# Patient Record
Sex: FEMALE | Race: WHITE | NOT HISPANIC OR LATINO | Employment: FULL TIME | URBAN - METROPOLITAN AREA
[De-identification: names, ages, dates, MRNs, and addresses within clinical notes are randomized per-mention and may not be internally consistent; named-entity substitution may affect disease eponyms.]

---

## 2019-04-23 ENCOUNTER — HISTORICAL (OUTPATIENT)
Dept: ADMINISTRATIVE | Facility: HOSPITAL | Age: 24
End: 2019-04-23

## 2019-04-23 LAB
ABS NEUT (OLG): 8.85 X10(3)/MCL (ref 2.1–9.2)
BASOPHILS # BLD AUTO: 0 X10(3)/MCL (ref 0–0.2)
BASOPHILS NFR BLD AUTO: 0 %
EOSINOPHIL # BLD AUTO: 0.4 X10(3)/MCL (ref 0–0.9)
EOSINOPHIL NFR BLD AUTO: 4 %
ERYTHROCYTE [DISTWIDTH] IN BLOOD BY AUTOMATED COUNT: 12.9 % (ref 11.5–17)
GROUP & RH: NORMAL
HBV SURFACE AG SERPL QL IA: NEGATIVE
HCT VFR BLD AUTO: 38.6 % (ref 37–47)
HGB BLD-MCNC: 12.4 GM/DL (ref 12–16)
HIV 1+2 AB+HIV1 P24 AG SERPL QL IA: NEGATIVE
LYMPHOCYTES # BLD AUTO: 1.3 X10(3)/MCL (ref 0.6–4.6)
LYMPHOCYTES NFR BLD AUTO: 11 %
MCH RBC QN AUTO: 30.2 PG (ref 27–31)
MCHC RBC AUTO-ENTMCNC: 32.1 GM/DL (ref 33–36)
MCV RBC AUTO: 94.1 FL (ref 80–94)
MONOCYTES # BLD AUTO: 0.6 X10(3)/MCL (ref 0.1–1.3)
MONOCYTES NFR BLD AUTO: 6 %
NEUTROPHILS # BLD AUTO: 8.85 X10(3)/MCL (ref 2.1–9.2)
NEUTROPHILS NFR BLD AUTO: 78 %
PLATELET # BLD AUTO: 263 X10(3)/MCL (ref 130–400)
PMV BLD AUTO: 9.9 FL (ref 9.4–12.4)
RBC # BLD AUTO: 4.1 X10(6)/MCL (ref 4.2–5.4)
T PALLIDUM AB SER QL: NORMAL
TSH SERPL-ACNC: 1.76 MIU/L (ref 0.36–3.74)
WBC # SPEC AUTO: 11.3 X10(3)/MCL (ref 4.5–11.5)

## 2019-04-25 LAB — FINAL CULTURE: NO GROWTH

## 2019-05-02 ENCOUNTER — HISTORICAL (OUTPATIENT)
Dept: ADMINISTRATIVE | Facility: HOSPITAL | Age: 24
End: 2019-05-02

## 2019-05-02 LAB — GLUCOSE 1H P 100 G GLC PO SERPL-MCNC: 78 MG/DL (ref 100–180)

## 2019-08-29 ENCOUNTER — HISTORICAL (OUTPATIENT)
Dept: LAB | Facility: HOSPITAL | Age: 24
End: 2019-08-29

## 2019-08-31 LAB — FINAL CULTURE: NORMAL

## 2022-08-31 ENCOUNTER — HOSPITAL ENCOUNTER (EMERGENCY)
Facility: HOSPITAL | Age: 27
Discharge: HOME | End: 2022-08-31
Attending: EMERGENCY MEDICINE
Payer: COMMERCIAL

## 2022-08-31 VITALS
SYSTOLIC BLOOD PRESSURE: 119 MMHG | HEART RATE: 95 BPM | RESPIRATION RATE: 18 BRPM | BODY MASS INDEX: 36.8 KG/M2 | TEMPERATURE: 98 F | WEIGHT: 200 LBS | OXYGEN SATURATION: 97 % | DIASTOLIC BLOOD PRESSURE: 79 MMHG | HEIGHT: 62 IN

## 2022-08-31 DIAGNOSIS — K04.7 DENTAL ABSCESS: Primary | ICD-10-CM

## 2022-08-31 PROCEDURE — 99284 EMERGENCY DEPT VISIT MOD MDM: CPT | Mod: 25

## 2022-08-31 RX ORDER — HYDROCODONE BITARTRATE AND ACETAMINOPHEN 7.5; 325 MG/1; MG/1
1 TABLET ORAL EVERY 6 HOURS PRN
Qty: 20 TABLET | Refills: 0 | OUTPATIENT
Start: 2022-08-31 | End: 2023-04-10

## 2022-08-31 RX ORDER — AMOXICILLIN 500 MG/1
500 CAPSULE ORAL 3 TIMES DAILY
Qty: 21 CAPSULE | Refills: 0 | Status: SHIPPED | OUTPATIENT
Start: 2022-08-31 | End: 2022-09-07

## 2022-08-31 NOTE — ED PROVIDER NOTES
Encounter Date: 8/31/2022       History     Chief Complaint   Patient presents with    Dental Pain     Pt c/o having a toothache onset one month ago.     27-year-old female comes to the emergency room stating she has been having pain in her left upper 3rd molar for a few weeks.  Pain became worse so she came to the ER.  She has seen a dentist who scheduled to pull her tooth, but the dentist once her cardiologist to clear her because she has mitral valve prolapse, so the procedure has been delayed.  She is having no fevers chills nor sweats.  No headache neck pain nausea vomiting.    Review of patient's allergies indicates:   Allergen Reactions    Asparaginase      Other reaction(s): rash, unsure    Cephalosporins      Other reaction(s): seizures, unknown     No past medical history on file.  No past surgical history on file.  No family history on file.     Review of Systems   Constitutional:  Negative for fever.   HENT:  Negative for sore throat.    Respiratory:  Negative for shortness of breath.    Cardiovascular:  Negative for chest pain.   Gastrointestinal:  Negative for nausea.   Genitourinary:  Negative for dysuria.   Musculoskeletal:  Negative for back pain.   Skin:  Negative for rash.   Neurological:  Negative for weakness.   Hematological:  Does not bruise/bleed easily.     Physical Exam     Initial Vitals [08/31/22 1808]   BP Pulse Resp Temp SpO2   119/79 95 18 97.7 °F (36.5 °C) 97 %      MAP       --         Physical Exam    Constitutional: She appears well-developed. No distress.   HENT:   Mouth/Throat: Oropharynx is clear and moist.   Left upper 3rd molar is intact.  There is no swelling.  No sign of infection.  No drainable abscess.   Eyes: Conjunctivae are normal.   Cardiovascular:  Normal rate, regular rhythm and normal heart sounds.     Exam reveals no gallop and no friction rub.       No murmur heard.  Pulmonary/Chest: Breath sounds normal. No respiratory distress. She has no wheezes. She has no  rhonchi. She has no rales.   Abdominal: Abdomen is soft. Bowel sounds are normal. She exhibits no distension. There is no abdominal tenderness. There is no guarding.   Musculoskeletal:         General: No edema.     Lymphadenopathy:     She has no cervical adenopathy.   Neurological: She is alert. She displays a negative Romberg sign. Coordination and gait normal. GCS eye subscore is 4. GCS verbal subscore is 5. GCS motor subscore is 6.   Skin: Skin is warm.   Psychiatric: She has a normal mood and affect.       ED Course   Procedures  Labs Reviewed - No data to display       Imaging Results    None          Medications - No data to display                       Clinical Impression:   Final diagnoses:  [K04.7] Dental abscess (Primary)      ED Disposition Condition    Discharge Stable          ED Prescriptions       Medication Sig Dispense Start Date End Date Auth. Provider    amoxicillin (AMOXIL) 500 MG capsule Take 1 capsule (500 mg total) by mouth 3 (three) times daily. for 7 days 21 capsule 8/31/2022 9/7/2022 Bg Arias Jr., MD    HYDROcodone-acetaminophen (NORCO) 7.5-325 mg per tablet Take 1 tablet by mouth every 6 (six) hours as needed for Pain. 20 tablet 8/31/2022 -- Bg Arias Jr., MD          Follow-up Information       Follow up With Specialties Details Why Contact Info      In 1 week               Bg Arias Jr., MD  08/31/22 3292

## 2023-04-10 ENCOUNTER — HOSPITAL ENCOUNTER (EMERGENCY)
Facility: HOSPITAL | Age: 28
Discharge: HOME | End: 2023-04-10
Attending: EMERGENCY MEDICINE
Payer: COMMERCIAL

## 2023-04-10 VITALS
TEMPERATURE: 98 F | SYSTOLIC BLOOD PRESSURE: 117 MMHG | HEART RATE: 77 BPM | OXYGEN SATURATION: 98 % | DIASTOLIC BLOOD PRESSURE: 72 MMHG | RESPIRATION RATE: 18 BRPM

## 2023-04-10 DIAGNOSIS — G44.319 ACUTE POST-TRAUMATIC HEADACHE, NOT INTRACTABLE: ICD-10-CM
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
## 2023-04-10 PROCEDURE — 99284 EMERGENCY DEPT VISIT MOD MDM: CPT

## 2023-04-10 PROCEDURE — 25000003 PHARM REV CODE 250: Performed by: EMERGENCY MEDICINE

## 2023-04-10 RX ORDER — HYDROCODONE BITARTRATE AND ACETAMINOPHEN 10; 325 MG/1; MG/1
1 TABLET ORAL EVERY 6 HOURS PRN
Qty: 20 TABLET | Refills: 0 | Status: SHIPPED | OUTPATIENT
Start: 2023-04-10 | End: 2023-04-15

## 2023-04-10 RX ORDER — IBUPROFEN 800 MG/1
800 TABLET ORAL 3 TIMES DAILY
Qty: 30 TABLET | Refills: 0 | Status: SHIPPED | OUTPATIENT
Start: 2023-04-10 | End: 2023-04-20

## 2023-04-10 RX ORDER — CYCLOBENZAPRINE HCL 10 MG
10 TABLET ORAL 3 TIMES DAILY PRN
Qty: 15 TABLET | Refills: 0 | Status: SHIPPED | OUTPATIENT
Start: 2023-04-10 | End: 2023-04-15

## 2023-04-10 RX ORDER — HYDROCODONE BITARTRATE AND ACETAMINOPHEN 10; 325 MG/1; MG/1
1 TABLET ORAL
Status: COMPLETED | OUTPATIENT
Start: 2023-04-10 | End: 2023-04-10

## 2023-04-10 RX ORDER — CYCLOBENZAPRINE HCL 10 MG
10 TABLET ORAL
Status: COMPLETED | OUTPATIENT
Start: 2023-04-10 | End: 2023-04-10

## 2023-04-10 RX ADMIN — HYDROCODONE BITARTRATE AND ACETAMINOPHEN 1 TABLET: 10; 325 TABLET ORAL at 09:04

## 2023-04-10 RX ADMIN — CYCLOBENZAPRINE HYDROCHLORIDE 10 MG: 10 TABLET, FILM COATED ORAL at 09:04
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
## 2023-07-25 ENCOUNTER — HOSPITAL ENCOUNTER (EMERGENCY)
Facility: HOSPITAL | Age: 28
Discharge: HOME | End: 2023-07-25
Attending: EMERGENCY MEDICINE
Payer: MEDICAID

## 2023-07-25 VITALS
SYSTOLIC BLOOD PRESSURE: 140 MMHG | HEART RATE: 81 BPM | OXYGEN SATURATION: 98 % | DIASTOLIC BLOOD PRESSURE: 85 MMHG | RESPIRATION RATE: 17 BRPM | TEMPERATURE: 98 F

## 2023-07-25 DIAGNOSIS — Z30.09 EMERGENCY CONTRACEPTIVE COUNSELING: Primary | ICD-10-CM

## 2023-07-25 LAB
APPEARANCE UR: CLEAR
B-HCG SERPL QL: NEGATIVE
BACTERIA #/AREA URNS AUTO: NORMAL /HPF
BILIRUB UR QL STRIP.AUTO: NEGATIVE
COLOR UR: YELLOW
GLUCOSE UR QL STRIP.AUTO: NEGATIVE
KETONES UR QL STRIP.AUTO: ABNORMAL
LEUKOCYTE ESTERASE UR QL STRIP.AUTO: ABNORMAL
NITRITE UR QL STRIP.AUTO: NEGATIVE
PH UR STRIP.AUTO: 7 [PH]
PROT UR QL STRIP.AUTO: NEGATIVE
RBC #/AREA URNS AUTO: <5 /HPF
RBC UR QL AUTO: NEGATIVE
SP GR UR STRIP.AUTO: 1.01 (ref 1–1.03)
SQUAMOUS #/AREA URNS AUTO: <5 /HPF
UROBILINOGEN UR STRIP-ACNC: 0.2
WBC #/AREA URNS AUTO: <5 /HPF

## 2023-07-25 PROCEDURE — 81001 URINALYSIS AUTO W/SCOPE: CPT | Performed by: PHYSICIAN ASSISTANT

## 2023-07-25 PROCEDURE — 99283 EMERGENCY DEPT VISIT LOW MDM: CPT

## 2023-07-25 PROCEDURE — 81025 URINE PREGNANCY TEST: CPT | Performed by: PHYSICIAN ASSISTANT

## 2023-07-25 NOTE — ED PROVIDER NOTES
Encounter Date: 7/25/2023       History     Chief Complaint   Patient presents with    medical     Had unprotected intercourse last Saturday, went to a clinic and was tld to come to the er because they could not administer the med( Plan B) to her. Denies any other complaints     28-year-old female presents to ED for evaluation emergency contraceptive.  Patient reports that she had unprotected intercourse on Saturday morning.  Patient is concerned for possible pregnancy like to have plan be.  States she went to pharmacy to get over-the-counter however was told she was outside of the window.  Patient denies any symptoms of abdominal pain, vaginal bleeding or vaginal discharge.    The history is provided by the patient. No  was used.   Review of patient's allergies indicates:   Allergen Reactions    Asparaginase      Other reaction(s): rash, unsure    Cephalosporins      Other reaction(s): seizures, unknown     No past medical history on file.  Past Surgical History:   Procedure Laterality Date    SINUS SURGERY       No family history on file.  Social History     Tobacco Use    Smoking status: Never    Smokeless tobacco: Never   Substance Use Topics    Alcohol use: Not Currently     Review of Systems   Constitutional:  Negative for chills, fatigue and fever.   Respiratory:  Negative for shortness of breath.    Cardiovascular:  Negative for chest pain.   Gastrointestinal:  Negative for abdominal pain, nausea and vomiting.   Genitourinary:  Negative for dysuria, menstrual problem, pelvic pain, urgency, vaginal bleeding, vaginal discharge and vaginal pain.   Musculoskeletal:  Negative for back pain.   Skin:  Negative for rash.   Neurological:  Negative for weakness.   Hematological:  Does not bruise/bleed easily.     Physical Exam     Initial Vitals [07/25/23 1206]   BP Pulse Resp Temp SpO2   (!) 140/85 81 17 98.4 °F (36.9 °C) 98 %      MAP       --         Physical Exam    Nursing note and vitals  reviewed.  Constitutional: She appears well-developed and well-nourished.   HENT:   Head: Normocephalic and atraumatic.   Right Ear: Tympanic membrane and external ear normal.   Left Ear: Tympanic membrane and external ear normal.   Mouth/Throat: Uvula is midline, oropharynx is clear and moist and mucous membranes are normal. No trismus in the jaw. No uvula swelling. No oropharyngeal exudate, posterior oropharyngeal edema or posterior oropharyngeal erythema.   Eyes: Conjunctivae are normal. Pupils are equal, round, and reactive to light.   Neck: Neck supple.   Normal range of motion.  Cardiovascular:  Normal rate, regular rhythm and normal heart sounds.           Pulmonary/Chest: Breath sounds normal. She has no wheezes. She has no rhonchi. She has no rales.   Abdominal: Abdomen is soft. Bowel sounds are normal. There is no abdominal tenderness.   Musculoskeletal:         General: Normal range of motion.      Cervical back: Normal range of motion and neck supple.     Neurological: She is alert and oriented to person, place, and time. She has normal strength. No cranial nerve deficit or sensory deficit. GCS score is 15. GCS eye subscore is 4. GCS verbal subscore is 5. GCS motor subscore is 6.   Skin: Skin is warm and dry.   Psychiatric: She has a normal mood and affect.       ED Course   Procedures  Labs Reviewed   URINALYSIS, REFLEX TO URINE CULTURE - Abnormal; Notable for the following components:       Result Value    Ketones, UA Trace (*)     Leukocyte Esterase, UA 1+ (*)     All other components within normal limits   PREGNANCY TEST, URINE RAPID - Normal   URINALYSIS, MICROSCOPIC - Normal          Imaging Results    None          Medications - No data to display  Medical Decision Making:   Initial Assessment:   28-year-old female presents to ED for evaluation emergency contraceptive.  Patient reports that she had unprotected intercourse on Saturday morning around 0500.  Patient is concerned for possible  pregnancy like to have plan be.  States she went to pharmacy to get over-the-counter however was told she was outside of the window.  Patient denies any symptoms of abdominal pain, vaginal bleeding or vaginal discharge.  Differential Diagnosis:   Emergency contraceptive, pregnancy  ED Management:  Patient afebrile and in no acute distress.  Pregnancy test negative. Patient last had unprotected intercourse greater than 72 hours ago, therefore unable to give RX for plan B. discussed contraceptive methods with patient reviewed follow up with GYN. Discussed follow up with health for possible STD testing as indicated. Return ED precautions given.  I provided counseling to patient with regard to condition, the treatment plan, specific conditions for return, and the importance of follow up. Detailed written and verbal instructions provided to patient and she expressed a verbal understanding of the discharge instructions and overall management plan. Reiterated the importance of medication administration and safety. Advised patient to follow up with primary care provider in 3-5 days or sooner if needed.  Answered questions at this time. The patient is stable for discharge.                           Clinical Impression:   Final diagnoses:  [Z30.09] Emergency contraceptive counseling (Primary)        ED Disposition Condition    Discharge Stable          ED Prescriptions    None       Follow-up Information       Follow up With Specialties Details Why Contact Info    PCP  In 1 week As needed, If you do not have a PCP you may call [phone] to help get set up If you do not have a PCP you may call [phone] to help get set up.    Yuval Robles MD Obstetrics and Gynecology Call in 3 days As needed [address] [facility] [address] [phone]      Paul Robles Jr., MD Obstetrics and Gynecology   [facility] [address] [phone]               FIDELIA Johnson  07/25/23 2583

## 2023-07-25 NOTE — FIRST PROVIDER EVALUATION
Medical screening examination initiated.  I have conducted a focused provider triage encounter, findings are as follows:    Brief history of present illness:  27 yo female presents to ED for emergency contraceptive. Patient reports to having unprotected intercourse 3 days ago. States past deadline for OTC plan B.     Vitals:    07/25/23 1206   BP: (!) 140/85   BP Location: Left arm   Patient Position: Sitting   Pulse: 81   Resp: 17   Temp: 98.4 °F (36.9 °C)   TempSrc: Oral   SpO2: 98%       Pertinent physical exam:  Patient is awake and alert and oriented.  Ambulatory to triage.  In no acute distress.      Brief workup plan:  UA, UPT    Preliminary workup initiated; this workup will be continued and followed by the physician or advanced practice provider that is assigned to the patient when roomed.  
regular/mild belly breathing and supraclavicular retractions

## 2024-11-19 ENCOUNTER — TELEPHONE (OUTPATIENT)
Dept: PEDIATRIC HEMATOLOGY/ONCOLOGY | Facility: CLINIC | Age: 29
End: 2024-11-19
Payer: MEDICAID

## 2024-11-19 NOTE — TELEPHONE ENCOUNTER
Puja, patient's mom called (had permission to speak to her) asking about recommendations for oncologist.  She was concerned about recent CT results.  Discussed with Dr. Sibley.  He recommend going to [facility].  Spoke to Kamala's NP, Kathe about all of this as well.  She will order an MRI and place referral to [facility].  Plan to keep AYA appt on 12/18.  Mom was very pleased with this plan and expressed her gratitude. Encouraged her to call with any issues. She verbalized understanding and agreement.

## 2024-12-16 ENCOUNTER — TELEPHONE (OUTPATIENT)
Dept: PEDIATRIC HEMATOLOGY/ONCOLOGY | Facility: CLINIC | Age: 29
End: 2024-12-16
Payer: MEDICAID

## 2024-12-16 NOTE — TELEPHONE ENCOUNTER
Called to remind patient about her upcoming AYA appt on Wed.  No answer, left message with contact information.

## 2024-12-18 ENCOUNTER — PATIENT MESSAGE (OUTPATIENT)
Dept: PEDIATRIC HEMATOLOGY/ONCOLOGY | Facility: CLINIC | Age: 29
End: 2024-12-18

## 2024-12-18 ENCOUNTER — LAB VISIT (OUTPATIENT)
Dept: LAB | Facility: HOSPITAL | Age: 29
End: 2024-12-18
Payer: MEDICAID

## 2024-12-18 ENCOUNTER — SOCIAL WORK (OUTPATIENT)
Dept: PEDIATRIC HEMATOLOGY/ONCOLOGY | Facility: CLINIC | Age: 29
End: 2024-12-18
Payer: MEDICAID

## 2024-12-18 ENCOUNTER — OFFICE VISIT (OUTPATIENT)
Dept: PEDIATRIC HEMATOLOGY/ONCOLOGY | Facility: CLINIC | Age: 29
End: 2024-12-18
Payer: MEDICAID

## 2024-12-18 VITALS
RESPIRATION RATE: 20 BRPM | HEIGHT: 64 IN | OXYGEN SATURATION: 99 % | HEART RATE: 71 BPM | DIASTOLIC BLOOD PRESSURE: 63 MMHG | BODY MASS INDEX: 27.8 KG/M2 | TEMPERATURE: 98 F | WEIGHT: 162.81 LBS | SYSTOLIC BLOOD PRESSURE: 110 MMHG

## 2024-12-18 DIAGNOSIS — Z85.6 HX OF ACUTE LYMPHOID LEUKEMIA IN REMISSION: ICD-10-CM

## 2024-12-18 DIAGNOSIS — F41.9 ANXIETY: ICD-10-CM

## 2024-12-18 DIAGNOSIS — Z85.6 HX OF ACUTE LYMPHOID LEUKEMIA IN REMISSION: Primary | ICD-10-CM

## 2024-12-18 DIAGNOSIS — Z92.21 HISTORY OF CANCER CHEMOTHERAPY: ICD-10-CM

## 2024-12-18 LAB
ALBUMIN SERPL BCP-MCNC: 4.1 G/DL (ref 3.5–5.2)
ALBUMIN/CREAT UR: 6 UG/MG (ref 0–30)
ALP SERPL-CCNC: 60 U/L (ref 40–150)
ALT SERPL W/O P-5'-P-CCNC: 14 U/L (ref 10–44)
ANION GAP SERPL CALC-SCNC: 9 MMOL/L (ref 8–16)
AST SERPL-CCNC: 16 U/L (ref 10–40)
B-HCG UR QL: NEGATIVE
BASOPHILS # BLD AUTO: 0.05 K/UL (ref 0–0.2)
BASOPHILS NFR BLD: 0.7 % (ref 0–1.9)
BILIRUB SERPL-MCNC: 0.3 MG/DL (ref 0.1–1)
BILIRUB UR QL STRIP: NEGATIVE
BUN SERPL-MCNC: 8 MG/DL (ref 6–20)
CALCIUM SERPL-MCNC: 9.6 MG/DL (ref 8.7–10.5)
CHLORIDE SERPL-SCNC: 105 MMOL/L (ref 95–110)
CLARITY UR REFRACT.AUTO: CLEAR
CO2 SERPL-SCNC: 25 MMOL/L (ref 23–29)
COLOR UR AUTO: YELLOW
CREAT SERPL-MCNC: 0.8 MG/DL (ref 0.5–1.4)
CREAT UR-MCNC: 100 MG/DL (ref 15–325)
DIFFERENTIAL METHOD BLD: NORMAL
EOSINOPHIL # BLD AUTO: 0.2 K/UL (ref 0–0.5)
EOSINOPHIL NFR BLD: 2.3 % (ref 0–8)
ERYTHROCYTE [DISTWIDTH] IN BLOOD BY AUTOMATED COUNT: 12.1 % (ref 11.5–14.5)
EST. GFR  (NO RACE VARIABLE): >60 ML/MIN/1.73 M^2
ESTIMATED AVG GLUCOSE: 97 MG/DL (ref 68–131)
GLUCOSE SERPL-MCNC: 83 MG/DL (ref 70–110)
GLUCOSE UR QL STRIP: NEGATIVE
HBA1C MFR BLD: 5 % (ref 4–5.6)
HCT VFR BLD AUTO: 44 % (ref 37–48.5)
HGB BLD-MCNC: 14.1 G/DL (ref 12–16)
HGB UR QL STRIP: NEGATIVE
IMM GRANULOCYTES # BLD AUTO: 0.03 K/UL (ref 0–0.04)
IMM GRANULOCYTES NFR BLD AUTO: 0.4 % (ref 0–0.5)
KETONES UR QL STRIP: NEGATIVE
LEUKOCYTE ESTERASE UR QL STRIP: NEGATIVE
LYMPHOCYTES # BLD AUTO: 2.1 K/UL (ref 1–4.8)
LYMPHOCYTES NFR BLD: 27.8 % (ref 18–48)
MAGNESIUM SERPL-MCNC: 2.1 MG/DL (ref 1.6–2.6)
MCH RBC QN AUTO: 29.2 PG (ref 27–31)
MCHC RBC AUTO-ENTMCNC: 32 G/DL (ref 32–36)
MCV RBC AUTO: 91 FL (ref 82–98)
MICROALBUMIN UR DL<=1MG/L-MCNC: 6 UG/ML
MONOCYTES # BLD AUTO: 0.5 K/UL (ref 0.3–1)
MONOCYTES NFR BLD: 6.5 % (ref 4–15)
NEUTROPHILS # BLD AUTO: 4.8 K/UL (ref 1.8–7.7)
NEUTROPHILS NFR BLD: 62.3 % (ref 38–73)
NITRITE UR QL STRIP: NEGATIVE
NRBC BLD-RTO: 0 /100 WBC
PH UR STRIP: 8 [PH] (ref 5–8)
PHOSPHATE SERPL-MCNC: 3.1 MG/DL (ref 2.7–4.5)
PLATELET # BLD AUTO: 293 K/UL (ref 150–450)
PMV BLD AUTO: 9.7 FL (ref 9.2–12.9)
POTASSIUM SERPL-SCNC: 4.4 MMOL/L (ref 3.5–5.1)
PROT SERPL-MCNC: 7.8 G/DL (ref 6–8.4)
PROT UR QL STRIP: NEGATIVE
RBC # BLD AUTO: 4.83 M/UL (ref 4–5.4)
SODIUM SERPL-SCNC: 139 MMOL/L (ref 136–145)
SP GR UR STRIP: 1.02 (ref 1–1.03)
URN SPEC COLLECT METH UR: NORMAL
WBC # BLD AUTO: 7.66 K/UL (ref 3.9–12.7)

## 2024-12-18 PROCEDURE — 85025 COMPLETE CBC W/AUTO DIFF WBC: CPT | Performed by: NURSE PRACTITIONER

## 2024-12-18 PROCEDURE — 3078F DIAST BP <80 MM HG: CPT | Mod: CPTII,,, | Performed by: NURSE PRACTITIONER

## 2024-12-18 PROCEDURE — 3008F BODY MASS INDEX DOCD: CPT | Mod: CPTII,,, | Performed by: NURSE PRACTITIONER

## 2024-12-18 PROCEDURE — 81025 URINE PREGNANCY TEST: CPT | Performed by: NURSE PRACTITIONER

## 2024-12-18 PROCEDURE — 99213 OFFICE O/P EST LOW 20 MIN: CPT | Mod: PBBFAC | Performed by: NURSE PRACTITIONER

## 2024-12-18 PROCEDURE — 3074F SYST BP LT 130 MM HG: CPT | Mod: CPTII,,, | Performed by: NURSE PRACTITIONER

## 2024-12-18 PROCEDURE — 81003 URINALYSIS AUTO W/O SCOPE: CPT | Performed by: NURSE PRACTITIONER

## 2024-12-18 PROCEDURE — 83735 ASSAY OF MAGNESIUM: CPT | Performed by: NURSE PRACTITIONER

## 2024-12-18 PROCEDURE — 83036 HEMOGLOBIN GLYCOSYLATED A1C: CPT | Performed by: NURSE PRACTITIONER

## 2024-12-18 PROCEDURE — 80053 COMPREHEN METABOLIC PANEL: CPT | Performed by: NURSE PRACTITIONER

## 2024-12-18 PROCEDURE — 84100 ASSAY OF PHOSPHORUS: CPT | Performed by: NURSE PRACTITIONER

## 2024-12-18 PROCEDURE — 99214 OFFICE O/P EST MOD 30 MIN: CPT | Mod: S$PBB,,, | Performed by: NURSE PRACTITIONER

## 2024-12-18 PROCEDURE — 82652 VIT D 1 25-DIHYDROXY: CPT | Performed by: NURSE PRACTITIONER

## 2024-12-18 PROCEDURE — 82043 UR ALBUMIN QUANTITATIVE: CPT | Performed by: NURSE PRACTITIONER

## 2024-12-18 PROCEDURE — 1159F MED LIST DOCD IN RCRD: CPT | Mod: CPTII,,, | Performed by: NURSE PRACTITIONER

## 2024-12-18 PROCEDURE — 99999 PR PBB SHADOW E&M-EST. PATIENT-LVL III: CPT | Mod: PBBFAC,,, | Performed by: NURSE PRACTITIONER

## 2024-12-18 RX ORDER — ESCITALOPRAM OXALATE 10 MG/1
10 TABLET ORAL DAILY
COMMUNITY

## 2024-12-18 RX ORDER — BUSPIRONE HYDROCHLORIDE 5 MG/1
5 TABLET ORAL 3 TIMES DAILY
COMMUNITY

## 2024-12-18 NOTE — PROGRESS NOTES
REMIGIO met with pt and pt's mom with pt's permission [facility].  SW confirmed , address, phone #, email and insurance (Medicaid).      Pt and her 6yo daughter (Berenice) live in a trailer mom owns and mom pays rent for the property.    Pt is self-employed cleaning houses and receives SNAP.     Pt is in school online working towards getting her bachelor's in English.    Pt has a car.    No reported SW concerns.

## 2024-12-18 NOTE — PROGRESS NOTES
Kamala here for AYA.  Explained to her how our clinic functions.  She and mom very pleasant.  She denies any issues at this time.  Gas card provided to Kamala.    Labs obtained and urine specimen.  Plan to call Kamala with all results.  Kamala has a scheduled echo next Monday 12/23.  She will send me the results.  We can refer adult Cardiology as needed. Plan to follow up in one year or soon as needed. She and her mom expressed their gratitude.

## 2024-12-23 LAB — 1,25(OH)2D3 SERPL-MCNC: 49 PG/ML (ref 20–79)
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
## 2025-01-06 NOTE — PROGRESS NOTES
Pediatric AYA  PROGRESS NOTE    Subjective:       Patient ID: Kamala Elias is a 29 year old female    Chief Complaint:    Here for scheduled comprehensive AYA Cancer Care and Late Effects monitoring. Kamala follows with PCP Kathe Calzada NP in [facility].     History of Present Illness:   Kamala Elias is a 29 year old female with a history of Pre B ALL here today for comprehensive AYA cancer care and late effects monitoring. She was initially diagnosed in July 2000 in [place]. She was treated with chemotherapy. She finished treatment October 2002.    Today Kamala is here with her mother and notes she is doing well overall. She denies recent fever and illness. Kamala reports history of anxiety and sleep issues. Denies headaches, nausea, vomiting, diarrhea, constipation. Denies lethargy, night sweats, no sob. Does mention intentional weight loss. Also notes menstrual cycles more heavy and irregular over last few years with menstrual cramps. Notes some shotty axillary lymph nodes palpable. Was seen over last year for lymphadenopathy under her right axilla region.       Oncology History and Therapy :  originally diagnosed in [place] with Pre B ALL July 6, 2000. Completed therapy October 2002.    Cumulative dosing:  Daunorubicin: 525 mg/m2  Total Anthracycline converted dose: 262 mg/m2    Post Therapy Complications: seizure and serum sickness with peg asparaginase, pancreatitis, vincristine related neuropathy, sensitivity to steroids, obesity, sleep disturbances    ECHO with Grade II Mitral valve Regurgitation, Mitral Valve Insufficiency, Cardiomyopathy, Follows Dr. Braden      SH:  Currently in school for a Bachelor degree in English. Lives with her 5 year old daughter and overall doing well. She works as a house keeper.   Past history of drug abuse, clean over 6 years.        ROS:   Review of Systems   Constitutional: Negative.  Negative for fever, malaise/fatigue and weight loss.   HENT: Negative.     Eyes:  Negative.    Respiratory: Negative.     Cardiovascular:         Known mitral regurgitation, cardiomyopathy, yearly echos   Gastrointestinal: Negative.    Genitourinary: Negative.    Skin:  Negative for rash.   Neurological:         Hx of vcr related neuropathy   Endo/Heme/Allergies: Negative.    Psychiatric/Behavioral:          Hx of anxiety and difficulty sleeping         Physical Exam  Constitutional:       Appearance: Normal appearance.   HENT:      Head: Normocephalic.      Nose: No congestion.      Mouth/Throat:      Mouth: Mucous membranes are moist.   Cardiovascular:      Rate and Rhythm: Normal rate and regular rhythm.      Pulses: Normal pulses.      Heart sounds: Normal heart sounds. No murmur heard.  Pulmonary:      Effort: Pulmonary effort is normal.      Breath sounds: Normal breath sounds.   Abdominal:      General: Bowel sounds are normal.      Palpations: Abdomen is soft. There is no mass.   Musculoskeletal:         General: Normal range of motion.      Cervical back: Normal range of motion.   Lymphadenopathy:      Cervical: No cervical adenopathy.      Right cervical: No superficial, deep or posterior cervical adenopathy.     Left cervical: No superficial, deep or posterior cervical adenopathy.      Upper Body:      Right upper body: Pectoral adenopathy (shotty nodes palpable, no erythema or large nodes present) present. No supraclavicular or axillary adenopathy.      Left upper body: No supraclavicular, axillary or pectoral adenopathy.   Skin:     General: Skin is warm.      Capillary Refill: Capillary refill takes less than 2 seconds.      Coloration: Skin is not pale.      Findings: No erythema or rash.   Neurological:      General: No focal deficit present.      Mental Status: She is alert.   Psychiatric:         Mood and Affect: Mood normal.         Behavior: Behavior normal.           Objective:     Vitals:    12/18/24 1329   BP: 110/63   Pulse: 71   Resp: 20   Temp: 97.5 °F (36.4 °C)     Wt  Readings from Last 3 Encounters:   12/18/24 73.9 kg (162 lb 13 oz)   08/31/22 90.7 kg (200 lb)     Body mass index is 28.28 kg/m².    Radiology:  -12/4/2024 MRI Chest  FINDINGS:  There is no lymphadenopathy or mass identified at the lower neck or supraclavicular regions.  There is no axillary adenopathy or mass.  There is no pathology identified at the thoracic inlet.  No muscle pathology is identified.  There is no chest wall abnormality.  No abnormality is identified at the mediastinum and heart size is normal.  No pathology is identified along the esophagus.  There is no osseous lesion or marrow signal abnormality identified.  Limited imaging through the upper abdomen is normal.  There is no abnormal enhancement on the post contrast imaging.     Impression:     No acute disease or significant abnormality.  No soft tissue mass or adenopathy.    -11/15/2024 CT Upper Extremity  Findings:     There is skin thickening along the right lateral chest wall near the axilla. No pathologic adenopathy. There are 2 small right axillary nodes the largest measuring 7 mm. There is no soft tissue mass or fluid collection. Muscle bulk and attenuation is well preserved. There is a possible mass in the right supraclavicular fossa however is difficult to distinguish this from normal muscle. The cord is relatively smooth.     There is no acute fracture. The visualized lungs demonstrate dependent atelectasis. No adenopathy in the visualized mediastinum. Mild atherosclerosis.       Labs:   Component      Latest Ref Rng 10/30/2024 12/18/2024   WBC      3.90 - 12.70 K/uL  7.66    RBC      4.00 - 5.40 M/uL  4.83    Hemoglobin      12.0 - 16.0 g/dL  14.1    Hematocrit      37.0 - 48.5 %  44.0    MCV      82 - 98 fL  91    MCH      27.0 - 31.0 pg  29.2    MCHC      32.0 - 36.0 g/dL  32.0    RDW      11.5 - 14.5 %  12.1    Platelet Count      150 - 450 K/uL  293    MPV      9.2 - 12.9 fL  9.7    Immature Granulocytes      0.0 - 0.5 %  0.4     Gran # (ANC)      1.8 - 7.7 K/uL  4.8    Immature Grans (Abs)      0.00 - 0.04 K/uL  0.03    Lymph #      1.0 - 4.8 K/uL  2.1    Mono #      0.3 - 1.0 K/uL  0.5    Eos #      0.0 - 0.5 K/uL  0.2    Baso #      0.00 - 0.20 K/uL  0.05    nRBC      0 /100 WBC  0    Gran %      38.0 - 73.0 %  62.3    Lymph %      18.0 - 48.0 %  27.8    Mono %      4.0 - 15.0 %  6.5    Eos %      0.0 - 8.0 %  2.3    Basophil %      0.0 - 1.9 %  0.7    Differential Method  Automated    Sodium      136 - 145 mmol/L  139    Potassium      3.5 - 5.1 mmol/L  4.4    Chloride      95 - 110 mmol/L  105    CO2      23 - 29 mmol/L  25    Glucose      70 - 110 mg/dL  83    BUN      6 - 20 mg/dL  8    Creatinine      0.5 - 1.4 mg/dL  0.8    Calcium      8.7 - 10.5 mg/dL  9.6    PROTEIN TOTAL      6.0 - 8.4 g/dL  7.8    Albumin      3.5 - 5.2 g/dL  4.1    BILIRUBIN TOTAL      0.1 - 1.0 mg/dL  0.3    ALP      40 - 150 U/L  60    AST      10 - 40 U/L  16    ALT      10 - 44 U/L  14    eGFR      >60 mL/min/1.73 m^2  >60.0    Anion Gap      8 - 16 mmol/L  9    Specimen UA  Urine, Clean Catch    Color, UA      Yellow, Straw, Maci   Yellow    Appearance, UA      Clear   Clear    pH, UA      5.0 - 8.0   8.0    Spec Grav UA      1.005 - 1.030   1.020    Protein, UA      Negative   Negative    Glucose, UA      Negative   Negative    Ketones, UA      Negative   Negative    Bilirubin (UA)      Negative   Negative    Blood, UA      Negative   Negative    NITRITE UA      Negative   Negative    Leukocyte Esterase, UA      Negative   Negative    Cholesterol Total      100 - 199 mg/dL 169 (E)    Triglycerides      0 - 149 mg/dL 57 (E)    HDL      >39 mg/dL 65 (E)    VLDL Cholesterol Brayan      5 - 40 mg/dL 11 (E)    LDL Calculated      0 - 99 mg/dL 93 (E)    Urine Microalbumin      ug/mL  6.0    Urine Creatinine      15.0 - 325.0 mg/dL  100.0    MICROALB/CREAT RATIO      0.0 - 30.0 ug/mg  6.0    Hemoglobin A1C External      4.0 - 5.6 %  5.0    Estimated Avg  Glucose      68 - 131 mg/dL  97    Hepatitis C Ab      Non Reactive  Non Reactive (E)    TSH      0.450 - 4.500 uIU/mL 1.930 (E)    hCG Qualitative, Urine  Negative    Magnesium       1.6 - 2.6 mg/dL  2.1    Phosphorus Level      2.7 - 4.5 mg/dL  3.1    Vit D, 1,25-Dihydroxy      20 - 79 pg/mL  49       Legend:  (E) External lab result  Echo:    Assessment/Plan:   29 year young woman with a history of Pre B ALL, cardiomyopathy, mitral valve regurgitation, anxiety, and sleep disturbances. Here for annual AYA Late Effects and Survivorship Clinic. Patient seen today by multidisciplinary team including Psychiatry, Social Work, Physical Fitness Therapist, and Nutrition. Known dental caries and wisdom tooth extraction needed. Will see gyn team for irregular and heavy cycles. Follows annually with cardiology for known mitral valve regurgitation/cardiomyopathy.    Discussion:     Late Effects Summary:  - total anthracycline dose is 262 mg/m2    For her history of chemotherapy and risk of late effects  - recent axillary lymphadenopathy negative, will continue to monitor  - CBC and Chem are within normal limits  -Vitamin D normal  - HbA1c is 5.0  - urine analysis wnl  -Lipid panel wnl  -Tsh 1.9  - echo annually, followed by Dr. Braden, Cardiologist in [place]. Known mitral regurgitation and cardiomyopathy. Echo scheduled for December 24th, will fax results. EKG Jan 2025.  - recommend yearly eye exams and skin cancer screen and follow-up with Gyn   and PCP per their recs. Discussed mentioning heavy cycles that are irregular with pain.  -recommend every 6 month dental visit. Known dental caries and wisdom tooth extraction needed. Discussed making appt.  - recommend maintaining a healthy weight with regular exercise and a diet rich in   plant based goods  -continue seeing provider for known anxiety, currently on lexapro  - recommended avoiding all tobacco products and discussed that smoking   - recommend annual flu vaccine and  covid boosters as recommended  - return annually to AYA clinic      FU: aya in 1 year    Kirsten Marcano, MSN, APRN, FNP-C  Pediatric Hematology Oncology   [facility]

## 2025-03-05 PROCEDURE — 87661 TRICHOMONAS VAGINALIS AMPLIF: CPT | Performed by: OBSTETRICS & GYNECOLOGY

## 2025-03-05 PROCEDURE — 87591 N.GONORRHOEAE DNA AMP PROB: CPT | Performed by: OBSTETRICS & GYNECOLOGY

## 2025-03-05 PROCEDURE — 87491 CHLMYD TRACH DNA AMP PROBE: CPT | Performed by: OBSTETRICS & GYNECOLOGY

## 2025-03-05 PROCEDURE — 87624 HPV HI-RISK TYP POOLED RSLT: CPT | Performed by: OBSTETRICS & GYNECOLOGY
